# Patient Record
Sex: FEMALE | Race: WHITE | NOT HISPANIC OR LATINO | Employment: OTHER | URBAN - METROPOLITAN AREA
[De-identification: names, ages, dates, MRNs, and addresses within clinical notes are randomized per-mention and may not be internally consistent; named-entity substitution may affect disease eponyms.]

---

## 2017-10-12 ENCOUNTER — TRANSCRIBE ORDERS (OUTPATIENT)
Dept: ADMINISTRATIVE | Facility: HOSPITAL | Age: 52
End: 2017-10-12

## 2017-10-12 DIAGNOSIS — Z12.31 VISIT FOR SCREENING MAMMOGRAM: Primary | ICD-10-CM

## 2017-11-20 ENCOUNTER — HOSPITAL ENCOUNTER (OUTPATIENT)
Dept: MAMMOGRAPHY | Facility: HOSPITAL | Age: 52
Discharge: HOME | End: 2017-11-20
Admitting: INTERNAL MEDICINE

## 2017-11-20 DIAGNOSIS — Z12.31 VISIT FOR SCREENING MAMMOGRAM: ICD-10-CM

## 2017-11-20 PROCEDURE — 77063 BREAST TOMOSYNTHESIS BI: CPT

## 2017-11-20 PROCEDURE — G0202 SCR MAMMO BI INCL CAD: HCPCS

## 2017-11-27 PROCEDURE — 77063 BREAST TOMOSYNTHESIS BI: CPT | Performed by: RADIOLOGY

## 2017-11-27 PROCEDURE — 77067 SCR MAMMO BI INCL CAD: CPT | Performed by: RADIOLOGY

## 2019-06-05 ENCOUNTER — TRANSCRIBE ORDERS (OUTPATIENT)
Dept: ADMINISTRATIVE | Facility: HOSPITAL | Age: 54
End: 2019-06-05

## 2019-06-05 DIAGNOSIS — Z12.31 VISIT FOR SCREENING MAMMOGRAM: Primary | ICD-10-CM

## 2019-07-08 ENCOUNTER — APPOINTMENT (OUTPATIENT)
Dept: MAMMOGRAPHY | Facility: HOSPITAL | Age: 54
End: 2019-07-08

## 2019-07-18 ENCOUNTER — HOSPITAL ENCOUNTER (OUTPATIENT)
Dept: MAMMOGRAPHY | Facility: HOSPITAL | Age: 54
Discharge: HOME | End: 2019-07-18
Admitting: INTERNAL MEDICINE

## 2019-07-18 DIAGNOSIS — Z12.31 VISIT FOR SCREENING MAMMOGRAM: ICD-10-CM

## 2019-07-18 PROCEDURE — 77067 SCR MAMMO BI INCL CAD: CPT

## 2019-07-18 PROCEDURE — 77063 BREAST TOMOSYNTHESIS BI: CPT | Performed by: RADIOLOGY

## 2019-07-18 PROCEDURE — 77067 SCR MAMMO BI INCL CAD: CPT | Performed by: RADIOLOGY

## 2019-07-18 PROCEDURE — 77063 BREAST TOMOSYNTHESIS BI: CPT

## 2020-11-11 ENCOUNTER — OFFICE VISIT (OUTPATIENT)
Dept: ORTHOPEDIC SURGERY | Facility: CLINIC | Age: 55
End: 2020-11-11

## 2020-11-11 VITALS — HEART RATE: 105 BPM | BODY MASS INDEX: 30.37 KG/M2 | WEIGHT: 189 LBS | HEIGHT: 66 IN | OXYGEN SATURATION: 98 %

## 2020-11-11 DIAGNOSIS — M79.671 RIGHT FOOT PAIN: Primary | ICD-10-CM

## 2020-11-11 DIAGNOSIS — M25.871 MASS OF JOINT OF RIGHT FOOT: ICD-10-CM

## 2020-11-11 PROCEDURE — 99203 OFFICE O/P NEW LOW 30 MIN: CPT | Performed by: ORTHOPAEDIC SURGERY

## 2020-11-11 RX ORDER — MONTELUKAST SODIUM 10 MG/1
TABLET ORAL
COMMUNITY
Start: 2020-08-21

## 2020-11-11 RX ORDER — ALBUTEROL SULFATE 90 UG/1
AEROSOL, METERED RESPIRATORY (INHALATION)
COMMUNITY
Start: 2020-08-23

## 2020-11-11 NOTE — PROGRESS NOTES
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 She noticed one on the dorsal aspect over the 2nd TMT a few months ago, then recently noticed a mass on the dorsal-lateral aspect.  The mass over the 2nd TMT is intermittently painful- it hurts with pressure, sometimes a stabbing and shooting, burning pain.  Sometimes she has pain at rest.  Pain can be 5-8/10.  The more lateral mass has not been painful.  She has not had any treatment.  She has had varicose vein surgery, does not wear compression socks    Current Outpatient Medications on File Prior to Visit   Medication Sig Dispense Refill   • albuterol sulfate  (90 Base) MCG/ACT inhaler      • montelukast (SINGULAIR) 10 MG tablet        No current facility-administered medications on file prior to visit.       Allergies   Allergen Reactions   • Codeine Nausea And Vomiting      Past Medical History:   Diagnosis Date   • Asthma    • Hyperlipidemia      Past Surgical History:   Procedure Laterality Date   • ABDOMINAL SURGERY     • HERNIA REPAIR     • HYSTERECTOMY     • TUBAL ABDOMINAL LIGATION     • VASCULAR SURGERY     • WISDOM TOOTH EXTRACTION       Family History   Problem Relation Age of Onset   • Diabetes Father    • Aortic aneurysm Father    • Diabetes Other    • Breast cancer Neg Hx    • Ovarian cancer Neg Hx       Social History     Socioeconomic History   • Marital status:      Spouse name: Not on file   • Number of children: Not on file   • Years of education: Not on file   • Highest education level: Not on file   Tobacco Use   • Smoking status: Never Smoker   • Smokeless tobacco: Never Used   Substance and Sexual Activity   • Alcohol use: Not Currently   • Drug use: Defer   •  "Sexual activity: Defer        Review of Systems   Constitutional: Negative.    HENT: Negative.    Eyes: Negative.    Respiratory: Negative.    Cardiovascular: Negative.    Gastrointestinal: Negative.    Endocrine: Negative.    Genitourinary: Negative.    Musculoskeletal: Positive for arthralgias.   Skin: Negative.    Allergic/Immunologic: Negative.    Neurological: Negative.    Hematological: Negative.    Psychiatric/Behavioral: Negative.        The following portions of the patient's history were reviewed and updated as appropriate: allergies, current medications, past family history, past medical history, past social history, past surgical history and problem list.    Physical Exam:   Pulse 105   Ht 167.6 cm (66\")   Wt 85.7 kg (189 lb)   SpO2 98%   BMI 30.51 kg/m²   GENERAL: Body habitus: overweight    Lower extremity edema: Right: none; Left: none    Varicose veins:  Right: mild; Left: mild    Gait: normal     Mental Status:  awake and alert; oriented to person, place, and time    Voice:  clear  SKIN:  Lower extremity: Normal    Hair Growth(lower extremity):  Right:normal; Left:  normal  NAILS: Toenails: normal  HEENT: Head: Normocephalic, atraumatic,  without obvious abnormality.  eye: normal external eye, no icterus  ears:normal external ears  PULM:  Repiratory effort normal  CV:  Dorsalis Pedis:  Right: 2+; Left:2+    Posterior Tibial: Right:2+; Left:2+    Capillary Refill:  Brisk  MSK:  Hand:sensation intact      Tibia:  Right:  non tender; Left:  non tender      Ankle:  Right: non tender, ROM  normal and symmetric and motor function  normal; Left:  non tender, ROM  normal and symmetric and motor function  normal      Foot:  Right:  tender over 2nd TMT with capsular swelling, the second mass that she reports is in the EDB- no palpabl mass that i can identify, non tender.  slightly positive tinel's in deep peroneal nerve as it passes over the 2nd TMT, sensation intact, positiv \"piano key\" for 2nd " metatarsal; Left:  non tender      NEURO: Heel Walking:  Right:  normal; Left:  normal    Toe Walking:  Right:  mild pain in 2nd TMT; Left:  normal     [place] 5.07 monofilament test: normal    Lower extremity sensation: intact     Reflexes:  Biceps:  Right:  not tested; Left:  not tested           Quads:  Right:  not tested; Left:  not tested           Ankle:  Right:  not tested; Left:  not tested      Calf Atrophy:none    Motor Function: all 5/5         Medical Decision Making    Data Review:   ordered and reviewed x-rays today    Assessment and Plan/ Diagnosis/Treatment options:   1. Right foot pain  I explained that the most likely source of the dorsal 2nd TMT mass is a [id number] pseudocyst- she has arthritis and narrowing/osteophytes in the 2nd TMT and I explained the joint can be swollen, leading to the bump. Pain is aggravated by the deep peroneal nerve passing over the area and getting irritated. The more lateral mass is not palpable for me today.  We will proceed with an MRI to evaluate both areas.  I will see her after the MRI.    - XR Foot 2 View Right  - MRI Foot Right Without Contrast; Future    2. Mass of joint of right foot  As above  - MRI Foot Right Without Contrast; Future    3. Varicosities- she should wear compresion stockings to prevent recurrence/progressoiin         Radiology Ordered []  Radiology Reports Reviewed []      Radiology Images Reviewed []   Labs Reviewed []    Labs Ordered []   PCP Records Reviewed []    Provider Records Reviewed []    ER Records Reviewed []    Hospital Records Reviewed []    History Obtained From Family []    Phone conversation with Provider []    Records Requested []        Libby Gayle MD

## 2020-11-25 ENCOUNTER — HOSPITAL ENCOUNTER (OUTPATIENT)
Dept: MRI IMAGING | Facility: HOSPITAL | Age: 55
End: 2020-11-25

## 2020-12-02 ENCOUNTER — TELEPHONE (OUTPATIENT)
Dept: ORTHOPEDIC SURGERY | Facility: CLINIC | Age: 55
End: 2020-12-02

## 2020-12-02 NOTE — TELEPHONE ENCOUNTER
Dr. Darnell,    Looks like patient did not get MRI done could not afford. She is scheduled to see you today for a f/u from the MRI. Do you still need to see her?  Nancy

## 2020-12-02 NOTE — TELEPHONE ENCOUNTER
PATIENT STATED SHE COULD NOT DO MRI BECAUSE IT WAS GOING TO COST HER $500. SHE WANTS TO KNOW IF THERE IS ANYTHING ELSE DR. NIXON CAN DO? PATIENT CAN BE REACHED @ [phone]

## 2020-12-02 NOTE — TELEPHONE ENCOUNTER
CALLED PATIENT TO RESCHEDULE NO SHOW APPOINTMENT. PATIENT WAS NOT READY TO RESCHEDULE AT THIS TIME.

## 2020-12-03 NOTE — TELEPHONE ENCOUNTER
Called patient back and gave her the options available as far as another opinion, or possibly trying another imaging center to see if they could offer a better rate. She is going to check around and will call back if she needs anything further.     Taylor